# Patient Record
Sex: MALE | Race: WHITE
[De-identification: names, ages, dates, MRNs, and addresses within clinical notes are randomized per-mention and may not be internally consistent; named-entity substitution may affect disease eponyms.]

---

## 2019-02-19 ENCOUNTER — HOSPITAL ENCOUNTER (EMERGENCY)
Dept: HOSPITAL 36 - ER | Age: 24
Discharge: HOME | End: 2019-02-19
Payer: MEDICAID
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
## 2019-02-19 DIAGNOSIS — I10: ICD-10-CM

## 2019-02-19 DIAGNOSIS — S93.601A: ICD-10-CM
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
## 2019-02-19 DIAGNOSIS — S96.911A: ICD-10-CM
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
## 2019-02-19 DIAGNOSIS — S02.2XXA: Primary | ICD-10-CM

## 2019-02-19 PROCEDURE — Z7502: HCPCS

## 2019-02-19 NOTE — DIAGNOSTIC IMAGING REPORT
CT facial bones without IV contrast



HISTORY: Trauma



COMPARISON: CT head the same day



Technique: Axial images of the facial bones were obtained without IV

contrast. Reconstructions were made.  total DLP: 469,  CTDI21



Findings:



There appear to be nondisplaced nasal bone fractures which are age

indeterminate.  The bilateral orbital floors are intact.  The globes and

intraconal compartments are intact.  The bilateral TMJ joints are

intact.  The bilateral zygomatic arches are intact.  No significant

focal soft tissue swelling.



IMPRESSION: Age-indeterminate nondisplaced nasal fractures, correlate

clinically.
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
## 2019-02-19 NOTE — DIAGNOSTIC IMAGING REPORT
Head CT without intravenous contrast



Indication: Trauma



Comparison: Facial bone CT the same day 



Technique: Axial images were obtained from the vertex to the skull base

without IV contrast. Coronal reconstructions were made.   Total DLP:

723,  CTDI35





FINDINGS:



Images of the brain obtained without contrast demonstrate no acute

hemorrhage. No mass lesions identified. The ventricles and basal

cisterns are patent.  The gray-white matter differentiation is

preserved. There is no mass effect or midline shift.



No skull fractures identified. No soft tissue swelling. The paranasal

sinuses are clear.



IMPRESSION:



No acute intracranial abnormality.
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
## 2019-02-19 NOTE — DIAGNOSTIC IMAGING REPORT
CT cervical spine without IV contrast



HISTORY: Trauma



COMPARISON: None



Technique: Axial images were obtained from the skull base to the upper

thoracic spine without IV contrast. Multiplanar reconstructions were

made. 

 Total DLP: 685,  CTDI30



FINDINGS:



Images of the spine cervical spine obtained without contrast demonstrate

no evidence of a fracture or subluxation. The disc spaces are preserved.

 There may be minimal scoliosis.



No prevertebral soft tissue swelling.



No focal soft tissue abnormalities. The lung apices are clear.



IMPRESSION:



No evidence of a fracture or subluxation.



Possible minimal scoliosis.

## 2019-02-25 ENCOUNTER — HOSPITAL ENCOUNTER (EMERGENCY)
Dept: HOSPITAL 36 - ER | Age: 24
Discharge: HOME | End: 2019-02-25
Payer: MEDICAID

## 2019-02-25 DIAGNOSIS — R10.9: Primary | ICD-10-CM

## 2019-02-25 DIAGNOSIS — F17.200: ICD-10-CM

## 2019-02-25 LAB
ALBUMIN SERPL-MCNC: 4.7 GM/DL (ref 4.2–5.5)
ALBUMIN/GLOB SERPL: 2 {RATIO} (ref 1–1.8)
ALP SERPL-CCNC: 80 U/L (ref 34–104)
ALT SERPL-CCNC: 26 U/L (ref 7–52)
AMPHET UR-MCNC: NEGATIVE NG/ML
ANION GAP SERPL CALC-SCNC: 11.9 MMOL/L (ref 7–16)
APPEARANCE UR: CLEAR
AST SERPL-CCNC: 18 U/L (ref 13–39)
BARBITURATES UR-MCNC: NEGATIVE UG/ML
BASOPHILS # BLD AUTO: 0 TH/CUMM (ref 0–0.2)
BASOPHILS NFR BLD AUTO: 0.5 % (ref 0–2)
BENZODIAZEPINES PNL UR: NEGATIVE
BILIRUB SERPL-MCNC: 0.5 MG/DL (ref 0.3–1)
BILIRUB UR-MCNC: NEGATIVE MG/DL
BUN SERPL-MCNC: 12 MG/DL (ref 7–25)
CALCIUM SERPL-MCNC: 9.4 MG/DL (ref 8.6–10.3)
CANNABINOIDS SERPL QL CFM: POSITIVE
CHLORIDE SERPL-SCNC: 102 MEQ/L (ref 98–107)
CO2 SERPL-SCNC: 27.8 MEQ/L (ref 21–31)
COCAINE METAB.OTHER UR-MCNC: POSITIVE NG/ML
COLOR UR: YELLOW
CREAT SERPL-MCNC: 0.9 MG/DL (ref 0.7–1.3)
EOSINOPHIL # BLD AUTO: 0.1 TH/CMM (ref 0.1–0.4)
EOSINOPHIL NFR BLD AUTO: 1.5 % (ref 0–5)
ERYTHROCYTE [DISTWIDTH] IN BLOOD BY AUTOMATED COUNT: 12.6 % (ref 11.5–20)
GLOBULIN SER-MCNC: 2.4 GM/DL
GLUCOSE SERPL-MCNC: 102 MG/DL (ref 70–105)
GLUCOSE UR STRIP-MCNC: NEGATIVE MG/DL
HCT VFR BLD CALC: 49.9 % (ref 41–60)
HGB BLD-MCNC: 16.7 GM/DL (ref 12–16)
KETONES UR STRIP-MCNC: NEGATIVE MG/DL
LEUKOCYTE ESTERASE UR-ACNC: NEGATIVE
LYMPHOCYTE AB SER FC-ACNC: 2.1 TH/CMM (ref 1.5–3)
LYMPHOCYTES NFR BLD AUTO: 21.8 % (ref 20–50)
MCH RBC QN AUTO: 32.6 PG (ref 26–30)
MCHC RBC AUTO-ENTMCNC: 33.5 PG (ref 28–36)
MCV RBC AUTO: 97.2 FL (ref 80–99)
METHADONE UR CFM-MCNC: NEGATIVE NG/ML
METHAMPHET UR QL: NEGATIVE
MICRO URNS: NO
MONOCYTES # BLD AUTO: 0.5 TH/CMM (ref 0.3–1)
MONOCYTES NFR BLD AUTO: 5 % (ref 2–10)
NEUTROPHILS # BLD: 7 TH/CMM (ref 1.8–8)
NEUTROPHILS NFR BLD AUTO: 71.2 % (ref 40–80)
NITRITE UR QL STRIP: NEGATIVE
OPIATES UR QL: NEGATIVE
PCP UR-MCNC: NEGATIVE UG/L
PH UR STRIP: 7 [PH] (ref 4.6–8)
PLATELET # BLD: 210 TH/CMM (ref 150–400)
PMV BLD AUTO: 9.4 FL
POTASSIUM SERPL-SCNC: 3.7 MEQ/L (ref 3.5–5.1)
PROT UR STRIP-MCNC: NEGATIVE MG/DL
RBC # BLD AUTO: 5.13 MIL/CMM (ref 4.3–5.7)
RBC # UR STRIP: NEGATIVE /UL
SODIUM SERPL-SCNC: 138 MEQ/L (ref 136–145)
SP GR UR STRIP: 1.02 (ref 1–1.03)
TRICYCLICS UR QL: NEGATIVE
URINALYSIS COMPLETE PNL UR: (no result)
UROBILINOGEN UR STRIP-ACNC: 0.2 E.U./DL (ref 0.2–1)
WBC # BLD AUTO: 9.7 TH/CMM (ref 4.8–10.8)

## 2019-02-25 PROCEDURE — Z7502: HCPCS

## 2019-02-25 NOTE — DIAGNOSTIC IMAGING REPORT
CT scan abdomen and pelvis without intravenous contrast



HISTORY: Pain, trauma



Total DLP equals 372



CTDI equals 8.5



Axial sections were obtained from the xiphoid process down to the pubic

symphysis.



The liver exhibits a normal size and contour.  No focal lesions.  A 1.0

x 0.5 cm radiodensity appears to be situated the gallbladder consistent

with a calculus.  No biliary dilatation.  The spleen appears normal.  No

focal amenities seen within the pancreas.  No focal renal lesions.  No

hydronephrosis.



The exam of the pelvis demonstrates preservation of normal fat planes. 

No abnormal soft tissue masses or abnormal fluid collections.  No

definite abnormalities seen in the region of the appendix.



IMPRESSION:

1.  1.0 x 0.5 cm radiodensity that appears related the gallbladder

consistent with cholelithiasis.

2.  No other acute abnormalities
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
